# Patient Record
Sex: FEMALE | Race: WHITE | Employment: FULL TIME | URBAN - METROPOLITAN AREA
[De-identification: names, ages, dates, MRNs, and addresses within clinical notes are randomized per-mention and may not be internally consistent; named-entity substitution may affect disease eponyms.]

---

## 2017-04-12 ENCOUNTER — HOSPITAL ENCOUNTER (EMERGENCY)
Age: 32
Discharge: HOME | End: 2017-04-12
Attending: EMERGENCY MEDICINE
Payer: SELF-PAY

## 2017-04-12 VITALS
TEMPERATURE: 98 F | RESPIRATION RATE: 18 BRPM | HEART RATE: 86 BPM | SYSTOLIC BLOOD PRESSURE: 164 MMHG | OXYGEN SATURATION: 98 % | HEIGHT: 63 IN | BODY MASS INDEX: 38.98 KG/M2 | WEIGHT: 220 LBS | DIASTOLIC BLOOD PRESSURE: 76 MMHG

## 2017-04-12 DIAGNOSIS — N94.9 ADNEXAL CYST: ICD-10-CM

## 2017-04-12 DIAGNOSIS — R10.9 ACUTE LEFT FLANK PAIN: Primary | ICD-10-CM

## 2017-04-12 LAB
BACTERIA URNS QL MICRO: NORMAL /HPF
CASTS URNS QL MICRO: 0 /LPF
CRYSTALS URNS QL MICRO: 0 /LPF
EPI CELLS #/AREA URNS HPF: NORMAL /HPF
MUCOUS THREADS URNS QL MICRO: 0 /LPF
RBC #/AREA URNS HPF: NORMAL /HPF
WBC URNS QL MICRO: NORMAL /HPF

## 2017-04-12 PROCEDURE — 81015 MICROSCOPIC EXAM OF URINE: CPT | Performed by: EMERGENCY MEDICINE

## 2017-04-12 PROCEDURE — 81003 URINALYSIS AUTO W/O SCOPE: CPT | Performed by: EMERGENCY MEDICINE

## 2017-04-12 PROCEDURE — 99284 EMERGENCY DEPT VISIT MOD MDM: CPT | Performed by: EMERGENCY MEDICINE

## 2017-04-12 RX ORDER — SODIUM CHLORIDE 0.9 % (FLUSH) 0.9 %
5-10 SYRINGE (ML) INJECTION EVERY 8 HOURS
Status: DISCONTINUED | OUTPATIENT
Start: 2017-04-12 | End: 2017-04-12 | Stop reason: HOSPADM

## 2017-04-12 RX ORDER — TRAMADOL HYDROCHLORIDE 50 MG/1
50 TABLET ORAL
Qty: 20 TAB | Refills: 0 | Status: SHIPPED | OUTPATIENT
Start: 2017-04-12 | End: 2019-03-18 | Stop reason: ALTCHOICE

## 2017-04-12 RX ORDER — SODIUM CHLORIDE 0.9 % (FLUSH) 0.9 %
5-10 SYRINGE (ML) INJECTION AS NEEDED
Status: DISCONTINUED | OUTPATIENT
Start: 2017-04-12 | End: 2017-04-12 | Stop reason: HOSPADM

## 2017-04-12 NOTE — DISCHARGE INSTRUCTIONS

## 2017-04-12 NOTE — ED PROVIDER NOTES
HPI Comments: Patient presents to the ER complaining of acute onset of left-sided flank pain. Describes pain as achy and crampy. Reports some nausea but denies any vomiting. Denies any urinary symptoms. Patient states she is concerned as she has a history of kidney stone. Patient is a 32 y.o. female presenting with flank pain. The history is provided by the patient. Flank Pain    This is a new problem. The current episode started 6 to 12 hours ago. The problem has not changed since onset. The problem occurs constantly. The pain is present in the left side. The quality of the pain is described as aching. The pain is at a severity of 4/10. The pain is moderate. Pertinent negatives include no fever, no numbness, no weight loss, no abdominal pain, no bladder incontinence, no dysuria, no paresthesias and no paresis. Past Medical History:   Diagnosis Date    Generalized anxiety disorder 11/11/2016    Ill-defined condition     kidney stones    [facility] disorder[facility] ([facility] 11/11/2016       History reviewed. No pertinent surgical history. History reviewed. No pertinent family history. Social History     Social History    Marital status: SINGLE     Spouse name: N/A    Number of children: N/A    Years of education: N/A     Occupational History    Not on file. Social History Main Topics    Smoking status: Never Smoker    Smokeless tobacco: Never Used    Alcohol use No    Drug use: No    Sexual activity: Not on file     Other Topics Concern    Not on file     Social History Narrative         ALLERGIES: Review of patient's allergies indicates no known allergies. Review of Systems   Constitutional: Negative for fatigue, fever, unexpected weight change and weight loss. HENT: Negative for congestion and dental problem. Eyes: Negative for photophobia, redness and visual disturbance. Respiratory: Negative for chest tightness.     Cardiovascular: Negative for palpitations and leg swelling. Gastrointestinal: Positive for nausea. Negative for abdominal pain and vomiting. Endocrine: Negative for polydipsia and polyphagia. Genitourinary: Positive for flank pain. Negative for bladder incontinence, dysuria and urgency. Musculoskeletal: Negative for back pain and gait problem. Skin: Negative for pallor and rash. Allergic/Immunologic: Negative for food allergies and immunocompromised state. Neurological: Negative for syncope, speech difficulty, numbness and paresthesias. Hematological: Negative for adenopathy. Does not bruise/bleed easily. Psychiatric/Behavioral: Negative for behavioral problems and confusion. All other systems reviewed and are negative. Vitals:    04/12/17 1730   BP: 175/82   Pulse: 94   Resp: 20   Temp: 97.8 °F (36.6 °C)   SpO2: 98%   Weight: 99.8 kg (220 lb)   Height: 5' 3\" (1.6 m)            Physical Exam   Constitutional: She is oriented to person, place, and time. She appears well-developed and well-nourished. No distress. HENT:   Head: Normocephalic and atraumatic. Mouth/Throat: Oropharynx is clear and moist.   Eyes: Conjunctivae and EOM are normal. Pupils are equal, round, and reactive to light. No scleral icterus. Neck: Normal range of motion. Neck supple. No tracheal deviation present. No thyromegaly present. Cardiovascular: Normal rate, regular rhythm and normal heart sounds. Exam reveals no friction rub. No murmur heard. Pulmonary/Chest: No respiratory distress. She has no wheezes. Abdominal: Soft. Bowel sounds are normal. She exhibits no distension. There is no tenderness. Musculoskeletal: Normal range of motion. She exhibits no edema or deformity. Neurological: She is alert and oriented to person, place, and time. She has normal reflexes. Coordination normal.   Skin: Skin is warm and dry. No rash noted. She is not diaphoretic. No erythema. Nursing note and vitals reviewed.        MDM  Number of Diagnoses or Management Options  Diagnosis management comments: Will obtain Urinalysis here    7:09 PM  Review of outside records shows Apparently patient was seen at outside facility 2 days ago for similar complaints. She had an CT urogram that showed no obstructive uropathy as well as normal labs. Left adenexal cystic structure noted  Urinalysis here is normal  I do not feel any studies need to be repeated. We'll discharge home. She will need GYN follow-up       Amount and/or Complexity of Data Reviewed  Clinical lab tests: reviewed and ordered  Review and summarize past medical records: yes (IMPRESSION:   1.  Tiny nonobstructing right renal stone.  No hydronephrosis. 2.  Chronic innumerable small peritoneal nodules and small left adnexal cystic structure.  These are of unclear significance and etiology. Result Narrative  CT abdomen and pelvis without contrast    CLINICAL INDICATION:   Acute moderate left flank pain and mild nausea with reported history of kidney stones    COMPARISON: 12/26/2016    Multiple contiguous axial CT images were obtained through the abdomen and pelvis without intravenous or oral contrast. Coronal reformatted images are obtained to further evaluate organs. Radiation dose reduction techniques were used for this study. Our   CT scanners use one or all of the following: Automated exposure control, adjustment of the mA and/or kV according to patient size, iterative reconstruction. FINDINGS: The partially visualized lung bases are clear.        Abdomen: A punctate 1 mm nonobstructing right renal calculus is noted (coronal image 66).   There are no other renal or ureteral calculi. There is no hydronephrosis. There is no evidence of renal mass on limited non-contrast evaluation.     No discrete lesions are seen in the noncontrasted visualized portions of the liver or spleen.  Gallbladder is decompressed limiting evaluation, without obvious acute abnormality.  There are no obvious adrenal or pancreas masses.  No free air, focal   inflammatory changes or fluid collections in the abdomen. Aorta normal caliber. No evidence of bowel obstruction.  Moderate stool in the large bowel.  Innumerable small peritoneal nodules are unchanged and of unclear significance.  The most prominent of   these is in the lower abdomen near midline measuring 1.5 cm (axial image 87). Appendix not well seen.  No focal retrocecal inflammation. Pelvis CT: There are no distal ureteral or bladder calculi.  A 1.9 cm circumscribed round left adnexal fluid containing structure is unchanged.  Urinary bladder is decompressed.  No focal inflammatory changes or fluid collections in the pelvis.  Right   adnexal region is limited in visualization given multiple adjacent overlapping bowel loops.  No evidence of lymphadenopathy. There are no worrisome osseous lesions.   Status    )    Risk of Complications, Morbidity, and/or Mortality  Presenting problems: moderate  Diagnostic procedures: low  Management options: moderate    Patient Progress  Patient progress: stable    ED Course       Procedures